# Patient Record
Sex: FEMALE | Race: WHITE | NOT HISPANIC OR LATINO | Employment: OTHER | URBAN - METROPOLITAN AREA
[De-identification: names, ages, dates, MRNs, and addresses within clinical notes are randomized per-mention and may not be internally consistent; named-entity substitution may affect disease eponyms.]

---

## 2021-05-04 ENCOUNTER — APPOINTMENT (OUTPATIENT)
Dept: ULTRASOUND IMAGING | Facility: CLINIC | Age: 61
End: 2021-05-04
Attending: EMERGENCY MEDICINE
Payer: COMMERCIAL

## 2021-05-04 ENCOUNTER — HOSPITAL ENCOUNTER (EMERGENCY)
Facility: CLINIC | Age: 61
Discharge: HOME | End: 2021-05-04
Attending: EMERGENCY MEDICINE | Admitting: EMERGENCY MEDICINE
Payer: COMMERCIAL

## 2021-05-04 VITALS
HEART RATE: 78 BPM | OXYGEN SATURATION: 100 % | WEIGHT: 125 LBS | HEIGHT: 66 IN | BODY MASS INDEX: 20.09 KG/M2 | TEMPERATURE: 98.2 F | RESPIRATION RATE: 16 BRPM | SYSTOLIC BLOOD PRESSURE: 118 MMHG | DIASTOLIC BLOOD PRESSURE: 75 MMHG

## 2021-05-04 DIAGNOSIS — M76.62 ACHILLES TENDINITIS OF LEFT LOWER EXTREMITY: ICD-10-CM

## 2021-05-04 PROCEDURE — 93971 EXTREMITY STUDY: CPT | Mod: LT

## 2021-05-04 PROCEDURE — 99284 EMERGENCY DEPT VISIT MOD MDM: CPT | Mod: 25

## 2021-05-04 ASSESSMENT — ENCOUNTER SYMPTOMS
FEVER: 0
MYALGIAS: 1
VOMITING: 0
ABDOMINAL PAIN: 0

## 2021-05-04 ASSESSMENT — MIFFLIN-ST. JEOR: SCORE: 1138.75

## 2021-05-04 NOTE — ED PROVIDER NOTES
"  History   Chief Complaint:  Leg Pain       HPI   Amy Alex is a 61 year old female who presents with leg pain. The patient complains of left leg pain beginning yesterday while at rest. Pain is worse on her posterior ankle. She describes it as a \"jacobo horse that will not go away\". Reports that the pain has progressively worsened since onset and woke her from sleep at 0500 today. 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 She had a performance on Thursday (04/29) without any trauma, falls, or popping noises. Denies any chance in workout, new medications, or history of DVT. No vomiting, fever, abdominal pain, or chest pain.     Review of Systems   Constitutional: Negative for fever.   Cardiovascular: Negative for chest pain.   Gastrointestinal: Negative for abdominal pain and vomiting.   Musculoskeletal: Positive for myalgias.   All other systems reviewed and are negative.        Allergies:  No known drug allergies     Medications:  The patient is not currently taking any prescribed medications.     Past Medical History:    Scoliosis   Hypercholesterolemia   Postmonopausal    Atrophic vaginitis   Raynaud's disease     Family History:    Brother: PVD  Mother:  Hyperlipidemia     Social History:  Presents to emergency department alone   Works from home     Physical Exam     Patient Vitals for the past 24 hrs:   BP Temp Temp src Pulse Resp SpO2 Height Weight   05/04/21 1111 118/75 98.2  F (36.8  C) Temporal 78 16 100 % 1.676 m (5' 6\") 56.7 kg (125 lb)       Physical Exam  Vitals: reviewed by me  General: Pt seen on [facility], cooperative, and alert to conversation  Eyes: Tracking well, clear conjunctiva BL  ENT: MMM, midline trachea.   Lungs: No tachypnea, no accessory muscle use. No respiratory distress.   CV: Rate as above  Abd: Soft, non tender, no guarding, no rebound. Non distended  MSK: no joint effusion.  No evidence of trauma  Plantarflexion 5 out of " 5 strength.  Able to stand on tiptoe, has some pain however.  Negative Homans' sign.  Does have significant tenderness and some warmth and slight erythema over the proximal aspect of the Achilles tendon.  No other signs of infection seen, no other tenderness found in other areas of her leg.  Skin: No rash  Neuro: Clear speech and no facial droop.  Psych: Not RIS, no e/o AH/VH        Emergency Department Course   Imaging:  US Lower Extremity Venous Duplex Left  IMPRESSION: No evidence of deep venous thrombosis.  As read by Radiology.     Emergency Department Course:    Reviewed:  I reviewed nursing notes, vitals, past medical history and care everywhere    Assessments:  1227 I obtained history and examined the patient as noted above.   1342 I rechecked the patient and explained findings.     Disposition:  The patient was discharged to home.       Impression & Plan     Medical Decision Making:  Amy Alex is a very pleasant 61 year old female who presents to the emergency department with left lower extremity pain specifically around her achilles tendon. 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 Out of an abundance of caution, I did an ultrasound as well, which shows no signs of a DVT. She is able to dorsiflex and plantar flex very well and her planter flexion has 5/5 strength so I do not think that she has torn her achilles tendon. The redness and swelling in this area would lead me to believe that she would benefit from antiinflammatories, and I discussed this with her. She knows to follow with orthopedics within the next week if she is not dramatically improved. Patient fells comfortable with this plan, no evidence of an infection, no evidence of a joint effusion. Will plan for a discharge as above.       Diagnosis:    ICD-10-CM    1. Achilles tendinitis of left lower extremity  M76.62        Scribe Disclosure:  Sadaf MIRANDA  Polina, am serving as a scribe at 12:24 PM on 5/4/2021 to document services personally performed by Stephon Castelan MD based on my observations and the provider's statements to me.           Stephon Castelan MD  05/04/21 [id number]

## 2021-05-04 NOTE — DISCHARGE INSTRUCTIONS
As we discussed, the most likely cause of your pain at this point seems to be Achilles tendinitis.  This may be because of your recent recital and the amount of dancing you been doing.  Your ultrasound is normal with no evidence of a blood clot, or an infection.  I do think tendinitis is most likely cause, please take 600 mg of Motrin, 3 times a day as we discussed for the next 5 days.  Do follow with your orthopedist as above unless the pain is completely gone, but try and see them within the next week.  Try and avoid any activities that make your pain worse, as this may lead to worsening inflammation and possibly a tendon rupture as we discussed.  Come back to the ER with any other concerns.

## 2021-05-04 NOTE — ED TRIAGE NOTES
Patient with pain left lower leg that started yesterday. Patient states today it has  gotten worse.

## 2022-09-02 ENCOUNTER — OFFICE VISIT (OUTPATIENT)
Dept: URGENT CARE | Facility: URGENT CARE | Age: 62
End: 2022-09-02
Payer: COMMERCIAL

## 2022-09-02 VITALS
TEMPERATURE: 98.2 F | DIASTOLIC BLOOD PRESSURE: 80 MMHG | OXYGEN SATURATION: 100 % | RESPIRATION RATE: 16 BRPM | SYSTOLIC BLOOD PRESSURE: 120 MMHG | HEART RATE: 69 BPM

## 2022-09-02 DIAGNOSIS — Z20.822 EXPOSURE TO 2019 NOVEL CORONAVIRUS: ICD-10-CM

## 2022-09-02 DIAGNOSIS — R07.0 THROAT PAIN: Primary | ICD-10-CM

## 2022-09-02 LAB
DEPRECATED S PYO AG THROAT QL EIA: NEGATIVE
GROUP A STREP BY PCR: NOT DETECTED

## 2022-09-02 PROCEDURE — 87651 STREP A DNA AMP PROBE: CPT | Performed by: PHYSICIAN ASSISTANT

## 2022-09-02 PROCEDURE — U0005 INFEC AGEN DETEC AMPLI PROBE: HCPCS | Performed by: PHYSICIAN ASSISTANT

## 2022-09-02 PROCEDURE — 99203 OFFICE O/P NEW LOW 30 MIN: CPT | Mod: CS | Performed by: PHYSICIAN ASSISTANT

## 2022-09-02 PROCEDURE — U0003 INFECTIOUS AGENT DETECTION BY NUCLEIC ACID (DNA OR RNA); SEVERE ACUTE RESPIRATORY SYNDROME CORONAVIRUS 2 (SARS-COV-2) (CORONAVIRUS DISEASE [COVID-19]), AMPLIFIED PROBE TECHNIQUE, MAKING USE OF HIGH THROUGHPUT TECHNOLOGIES AS DESCRIBED BY CMS-2020-01-R: HCPCS | Performed by: PHYSICIAN ASSISTANT

## 2022-09-02 RX ORDER — CYCLOBENZAPRINE HCL 5 MG
5 TABLET ORAL DAILY
COMMUNITY
Start: 2022-08-22

## 2022-09-02 RX ORDER — METHYLPREDNISOLONE 4 MG
TABLET, DOSE PACK ORAL
COMMUNITY
Start: 2022-08-22

## 2022-09-02 RX ORDER — BENZOCAINE/MENTHOL 6 MG-10 MG
LOZENGE MUCOUS MEMBRANE
COMMUNITY
Start: 2022-08-22

## 2022-09-02 NOTE — PROGRESS NOTES
Assessment & Plan     Throat pain    You or your child have a sore throat (pharyngitis). This infection is caused by a virus. It can cause throat pain that is worse when swallowing, aching all over, headache, and fever. The infection may be spread by coughing, kissing, or touching others after touching your mouth or nose. Antibiotic medicines don't work against viruses. They are not used for treating this illness.     covid pending  Strep neg, culture pending    OTC motrin  Magic mouthwash for throat pain  - Streptococcus A Rapid Screen w/Reflex to PCR  - Symptomatic; Yes; 8/31/2022 COVID-19 Virus (Coronavirus) by PCR Nose  - Group A Streptococcus PCR Throat Swab  - magic mouthwash (ENTER INGREDIENTS IN COMMENTS) suspension; Swish and spit 5-10 mLs in mouth every 6 hours as needed 30 ml of Benadryl (12.5 mg/5 ml), 60 ml Maalox, 30 ml Viscous Lidocaine    Exposure to 2019 novel coronavirus    Check my chart for results     At today's visit with Amy Alex , we discussed results, diagnosis, medications and formulated a plan.  We also discussed red flags for immediate return to clinic/ER, as well as indications for follow up with PCP if not improved in 3 days. Patient understood and agreed to plan. Amy Alex was discharged with stable vitals and has no further questions.       No follow-ups on file.    Gomez Smith, [facility], PA-C  [facility]    Owen Reyes is a 62 year old, presenting for the following health issues:  Pharyngitis (Sore throat X 2 days.  has covid )      HPI   Review of Systems   Constitutional, HEENT, cardiovascular, pulmonary, gi and gu systems are negative, except as otherwise noted.      Objective    /80   Pulse 69   Temp 98.2  F (36.8  C) (Oral)   Resp 16   SpO2 100%   There is no height or weight on file to calculate BMI.  Physical Exam   GENERAL: healthy, alert and no distress  EYES: Eyes grossly normal to inspection, PERRL and  conjunctivae and sclerae normal  HENT: ear canals and TM's normal, nose and mouth without ulcers or lesions  NECK: no adenopathy, no asymmetry, masses, or scars and thyroid normal to palpation  RESP: lungs clear to auscultation - no rales, rhonchi or wheezes  CV: regular rate and rhythm, normal S1 S2, no S3 or S4, no murmur, click or rub, no peripheral edema and peripheral pulses strong  MS: no gross musculoskeletal defects noted, no edema  SKIN: no suspicious lesions or rashes  NEURO: Normal strength and tone, mentation intact and speech normal  PSYCH: mentation appears normal, affect normal/bright        Results for orders placed or performed in visit on 09/02/22   Streptococcus A Rapid Screen w/Reflex to PCR     Status: Normal    Specimen: Throat; Swab   Result Value Ref Range    Group A Strep antigen Negative Negative               @CAL@  @[facility]@

## 2022-09-03 LAB — SARS-COV-2 RNA RESP QL NAA+PROBE: NEGATIVE

## 2022-12-26 ENCOUNTER — HEALTH MAINTENANCE LETTER (OUTPATIENT)
Age: 62
End: 2022-12-26

## 2023-06-02 ENCOUNTER — HEALTH MAINTENANCE LETTER (OUTPATIENT)
Age: 63
End: 2023-06-02

## 2023-09-13 ENCOUNTER — ANCILLARY ORDERS (OUTPATIENT)
Dept: CT IMAGING | Facility: CLINIC | Age: 63
End: 2023-09-13

## 2023-09-13 DIAGNOSIS — E74.10 FRUCTOSE INTOLERANCE: ICD-10-CM

## 2023-09-13 DIAGNOSIS — K52.832 LYMPHOCYTIC COLITIS: Primary | ICD-10-CM

## 2023-09-13 DIAGNOSIS — R63.4 WEIGHT LOSS: ICD-10-CM

## 2023-09-27 ENCOUNTER — ANCILLARY PROCEDURE (OUTPATIENT)
Dept: CT IMAGING | Facility: CLINIC | Age: 63
End: 2023-09-27
Attending: HOSPITALIST
Payer: COMMERCIAL

## 2023-09-27 DIAGNOSIS — R63.4 WEIGHT LOSS: ICD-10-CM

## 2023-09-27 DIAGNOSIS — E74.10 FRUCTOSE INTOLERANCE: ICD-10-CM

## 2023-09-27 DIAGNOSIS — K52.832 LYMPHOCYTIC COLITIS: ICD-10-CM

## 2023-09-27 PROCEDURE — 74177 CT ABD & PELVIS W/CONTRAST: CPT

## 2023-09-27 PROCEDURE — 250N000011 HC RX IP 250 OP 636: Performed by: HOSPITALIST

## 2023-09-27 PROCEDURE — 250N000009 HC RX 250: Performed by: HOSPITALIST

## 2023-09-27 RX ORDER — IOPAMIDOL 755 MG/ML
75 INJECTION, SOLUTION INTRAVASCULAR ONCE
Status: COMPLETED | OUTPATIENT
Start: 2023-09-27 | End: 2023-09-27

## 2023-09-27 RX ADMIN — SODIUM CHLORIDE 40 ML: 9 INJECTION, SOLUTION INTRAVENOUS at 09:59

## 2023-09-27 RX ADMIN — IOPAMIDOL 75 ML: 755 INJECTION, SOLUTION INTRAVENOUS at 09:59

## 2024-06-23 ENCOUNTER — HEALTH MAINTENANCE LETTER (OUTPATIENT)
Age: 64
End: 2024-06-23

## 2024-08-18 ENCOUNTER — HOSPITAL ENCOUNTER (EMERGENCY)
Facility: CLINIC | Age: 64
Discharge: HOME | End: 2024-08-18
Attending: EMERGENCY MEDICINE | Admitting: EMERGENCY MEDICINE
Payer: COMMERCIAL

## 2024-08-18 ENCOUNTER — APPOINTMENT (OUTPATIENT)
Dept: GENERAL RADIOLOGY | Facility: CLINIC | Age: 64
End: 2024-08-18
Attending: EMERGENCY MEDICINE
Payer: COMMERCIAL

## 2024-08-18 VITALS
RESPIRATION RATE: 19 BRPM | HEART RATE: 63 BPM | OXYGEN SATURATION: 95 % | BODY MASS INDEX: 19.29 KG/M2 | WEIGHT: 120 LBS | DIASTOLIC BLOOD PRESSURE: 82 MMHG | SYSTOLIC BLOOD PRESSURE: 128 MMHG | TEMPERATURE: 97.8 F | HEIGHT: 66 IN

## 2024-08-18 DIAGNOSIS — R07.9 CHEST PAIN, UNSPECIFIED TYPE: ICD-10-CM

## 2024-08-18 LAB
ALBUMIN SERPL BCG-MCNC: 4.3 G/DL (ref 3.5–5.2)
ALP SERPL-CCNC: 83 U/L (ref 40–150)
ALT SERPL W P-5'-P-CCNC: 27 U/L (ref 0–50)
ANION GAP SERPL CALCULATED.3IONS-SCNC: 13 MMOL/L (ref 7–15)
AST SERPL W P-5'-P-CCNC: 31 U/L (ref 0–45)
ATRIAL RATE - MUSE: 60 BPM
BASOPHILS # BLD AUTO: 0 10E3/UL (ref 0–0.2)
BASOPHILS NFR BLD AUTO: 0 %
BILIRUB SERPL-MCNC: 0.4 MG/DL
BUN SERPL-MCNC: 11 MG/DL (ref 8–23)
CALCIUM SERPL-MCNC: 9.2 MG/DL (ref 8.8–10.4)
CHLORIDE SERPL-SCNC: 96 MMOL/L (ref 98–107)
CREAT SERPL-MCNC: 0.94 MG/DL (ref 0.51–0.95)
DIASTOLIC BLOOD PRESSURE - MUSE: NORMAL MMHG
EGFRCR SERPLBLD CKD-EPI 2021: 67 ML/MIN/1.73M2
EOSINOPHIL # BLD AUTO: 0 10E3/UL (ref 0–0.7)
EOSINOPHIL NFR BLD AUTO: 1 %
ERYTHROCYTE [DISTWIDTH] IN BLOOD BY AUTOMATED COUNT: 12.2 % (ref 10–15)
GLUCOSE SERPL-MCNC: 120 MG/DL (ref 70–99)
HCO3 SERPL-SCNC: 25 MMOL/L (ref 22–29)
HCT VFR BLD AUTO: 42.8 % (ref 35–47)
HGB BLD-MCNC: 14.5 G/DL (ref 11.7–15.7)
IMM GRANULOCYTES # BLD: 0 10E3/UL
IMM GRANULOCYTES NFR BLD: 0 %
INTERPRETATION ECG - MUSE: NORMAL
LYMPHOCYTES # BLD AUTO: 1.2 10E3/UL (ref 0.8–5.3)
LYMPHOCYTES NFR BLD AUTO: 42 %
MAGNESIUM SERPL-MCNC: 1.8 MG/DL (ref 1.7–2.3)
MCH RBC QN AUTO: 30.9 PG (ref 26.5–33)
MCHC RBC AUTO-ENTMCNC: 33.9 G/DL (ref 31.5–36.5)
MCV RBC AUTO: 91 FL (ref 78–100)
MONOCYTES # BLD AUTO: 0.5 10E3/UL (ref 0–1.3)
MONOCYTES NFR BLD AUTO: 18 %
NEUTROPHILS # BLD AUTO: 1.1 10E3/UL (ref 1.6–8.3)
NEUTROPHILS NFR BLD AUTO: 38 %
NRBC # BLD AUTO: 0 10E3/UL
NRBC BLD AUTO-RTO: 0 /100
P AXIS - MUSE: 56 DEGREES
PLAT MORPH BLD: ABNORMAL
PLATELET # BLD AUTO: 203 10E3/UL (ref 150–450)
POTASSIUM SERPL-SCNC: 3.7 MMOL/L (ref 3.4–5.3)
PR INTERVAL - MUSE: 152 MS
PROT SERPL-MCNC: 7.4 G/DL (ref 6.4–8.3)
QRS DURATION - MUSE: 76 MS
QT - MUSE: 412 MS
QTC - MUSE: 412 MS
R AXIS - MUSE: 66 DEGREES
RBC # BLD AUTO: 4.7 10E6/UL (ref 3.8–5.2)
RBC MORPH BLD: ABNORMAL
SODIUM SERPL-SCNC: 134 MMOL/L (ref 135–145)
SYSTOLIC BLOOD PRESSURE - MUSE: NORMAL MMHG
T AXIS - MUSE: 76 DEGREES
TROPONIN T SERPL HS-MCNC: 8 NG/L
TROPONIN T SERPL HS-MCNC: 8 NG/L
VARIANT LYMPHS BLD QL SMEAR: PRESENT
VENTRICULAR RATE- MUSE: 60 BPM
WBC # BLD AUTO: 2.9 10E3/UL (ref 4–11)

## 2024-08-18 PROCEDURE — 84484 ASSAY OF TROPONIN QUANT: CPT | Performed by: EMERGENCY MEDICINE

## 2024-08-18 PROCEDURE — 83735 ASSAY OF MAGNESIUM: CPT | Performed by: EMERGENCY MEDICINE

## 2024-08-18 PROCEDURE — 82565 ASSAY OF CREATININE: CPT | Performed by: EMERGENCY MEDICINE

## 2024-08-18 PROCEDURE — 71046 X-RAY EXAM CHEST 2 VIEWS: CPT

## 2024-08-18 PROCEDURE — 99285 EMERGENCY DEPT VISIT HI MDM: CPT | Mod: 25

## 2024-08-18 PROCEDURE — 93005 ELECTROCARDIOGRAM TRACING: CPT

## 2024-08-18 PROCEDURE — 36415 COLL VENOUS BLD VENIPUNCTURE: CPT | Performed by: EMERGENCY MEDICINE

## 2024-08-18 PROCEDURE — 85004 AUTOMATED DIFF WBC COUNT: CPT | Performed by: EMERGENCY MEDICINE

## 2024-08-18 ASSESSMENT — ACTIVITIES OF DAILY LIVING (ADL)
ADLS_ACUITY_SCORE: 35

## 2024-08-18 NOTE — ED NOTES
Bed: ED12  Expected date:   Expected time:   Means of arrival:   Comments:  524 64F chest pain - GREEN

## 2024-08-18 NOTE — ED TRIAGE NOTES
PT tested + for Covid on Thursday and started Paxlovid  on Friday.  Saw MD via virtual visit Thurs am.  Woke up with sharp CP this morning.  Pt denies on any meds.  Pt developed sore throat on Tuesday which continued into Wed and covid test was sl positive.  Rechecked covid testt Thursday and was +, had a virtual visit with MD.  Temp 101 and H/A, started Paxlovid.  H/A Saturday.  Today woke up with [facility] at approx 0445.  VSS per EMS.  EMS noted peaked T waves.  Of note pt mother was in ED room 12 (where pt is) and  a few days later at [facility].  Offered pt a different room but ok to stay in room 12       Triage Assessment (Adult)       Row Name 24 0620          Triage Assessment    Airway WDL WDL        Respiratory WDL    Respiratory WDL WDL        Skin Circulation/Temperature WDL    Skin Circulation/Temperature WDL WDL        Cardiac WDL    Cardiac WDL WDL        Peripheral/Neurovascular WDL    Peripheral Neurovascular WDL WDL        Cognitive/Neuro/Behavioral WDL    Cognitive/Neuro/Behavioral WDL WDL

## 2024-08-18 NOTE — ED PROVIDER NOTES
"  Emergency Department Note      History of Present Illness     Chief Complaint   Chest Pain and Covid Concern (PT tested + for Covid on Thursday and started Paxlovid  on Friday.  Saw MD via virtual visit Thurs am.  Woke up with sharp CP this morning)    FABRICIO Alex is a 64 year old female with a history of hyperlipidemia and lymphocytic colitis who presents to the ED via EMS for evaluation of chest pain. The patient reports that she developed a sore throat and body aches on Tuesday. On Wednesday, she was having some post nasal drip and cough.  COVID test was positive on Thursday.  She also developed a headache on Thursday. She saw a PCP via telephone visit on Thursday morning at 10 am, where she was prescribe Paxlovid and Tessalon Perles. She notes that her temperature has been up to 101. This morning the patient woke up at approximately 0445 with sharp chest pain, which lasted for 30 minutes. She has never had chest pain like this before. She is not currently having chest pain. The patient denies having leg swelling.      Independent Historian   None    Review of External Notes   Reviewed patient's telemedicine visit from 8/15/2024, patient was prescribed Tessalon Perles and Paxlovid for her COVID.    Past Medical History     Medical History and Problem List   Scoliosis   Hyperlipidemia   Postmenopausal    Atrophic vaginitis   Raynaud's disease   Lymphocytic colitis   Impingement syndrome of right shoulder     Medications   Benzonatate  Nirmatrelvir-ritonavir   Cetirizine  Fluticasone    Physical Exam     Patient Vitals for the past 24 hrs:   BP Temp Temp src Pulse Resp SpO2 Height Weight   08/18/24 0908 -- -- -- 63 19 95 % -- --   08/18/24 0847 -- -- -- -- -- -- 1.676 m (5' 6\") 54.4 kg (120 lb)   08/18/24 0845 -- -- -- 57 14 94 % -- --   08/18/24 0708 -- -- -- 64 16 96 % -- --   08/18/24 0700 -- -- -- 60 10 96 % -- --   08/18/24 0658 128/82 -- -- 65 18 96 % -- --   08/18/24 0648 136/85 -- -- 60 10 96 % " -- --   08/18/24 0600 (!) 143/97 97.8  F (36.6  C) Temporal -- 16 -- -- --     Physical Exam  General: Well-nourished, resting comfortably when I enter the room  Eyes: Pupils equal, conjunctivae pink no scleral icterus or conjunctival injection  ENT:  Moist mucus membranes  Respiratory:  Lungs clear to auscultation bilaterally, no crackles/rubs/wheezes.  Good air movement  CV: Normal rate and rhythm, no murmurs  GI:  Abdomen soft and non-distended.  No tenderness, guarding or rebound  Skin: Warm, dry.  No rashes or petechiae  Musculoskeletal: No peripheral edema or calf tenderness  Neuro: Alert and oriented to person/place/time  Psychiatric: Normal affect    Diagnostics     Lab Results   Labs Ordered and Resulted from Time of ED Arrival to Time of ED Departure   COMPREHENSIVE METABOLIC PANEL - Abnormal       Result Value    Sodium 134 (*)     Potassium 3.7      Carbon Dioxide (CO2) 25      Anion Gap 13      Urea Nitrogen 11.0      Creatinine 0.94      GFR Estimate 67      Calcium 9.2      Chloride 96 (*)     Glucose 120 (*)     Alkaline Phosphatase 83      AST 31      ALT 27      Protein Total 7.4      Albumin 4.3      Bilirubin Total 0.4     CBC WITH PLATELETS AND DIFFERENTIAL - Abnormal    WBC Count 2.9 (*)     RBC Count 4.70      Hemoglobin 14.5      Hematocrit 42.8      MCV 91      MCH 30.9      MCHC 33.9      RDW 12.2      Platelet Count 203      % Neutrophils 38      % Lymphocytes 42      % Monocytes 18      % Eosinophils 1      % Basophils 0      % Immature Granulocytes 0      NRBCs per 100 WBC 0      Absolute Neutrophils 1.1 (*)     Absolute Lymphocytes 1.2      Absolute Monocytes 0.5      Absolute Eosinophils 0.0      Absolute Basophils 0.0      Absolute Immature Granulocytes 0.0      Absolute NRBCs 0.0     RBC AND PLATELET MORPHOLOGY - Abnormal    RBC Morphology Confirmed RBC Indices      Platelet Assessment        Value: Automated Count Confirmed. Platelet morphology is normal.    Reactive Lymphocytes  Present (*)    TROPONIN T, HIGH SENSITIVITY - Normal    Troponin T, High Sensitivity 8     MAGNESIUM - Normal    Magnesium 1.8     TROPONIN T, HIGH SENSITIVITY - Normal    Troponin T, High Sensitivity 8       Imaging   Chest XR,  PA & LAT   Final Result   IMPRESSION: No pleural fluid or pneumothorax. No airspace disease or edema. Normal size of the heart.        EKG   ECG taken at 0636, ECG read at 0643  Normal sinus rhythm  Normal ECG   No significant change as compared to prior, dated 1/12/2015.  Rate 60 bpm. AK interval 152 ms. QRS duration 76 ms. QT/QTc 412/412 ms. P-R-T axes 56 66 76.    Independent Interpretation   Chest x-ray does not show any sign of consolidation    ED Course      Discussion of Management   None    ED Course   ED Course as of 08/18/24 0954   Sun Aug 18, 2024   0623 I obtained history and examined the patient as noted above.    0926 I rechecked the patient and explained findings.      Additional Documentation  None    Medical Decision Making / Diagnosis     JENI Alex is a 64 year old female with a history of hyperlipidemia and lymphocytic colitis presents to the emergency department with a complaint of chest pain.  Patient started to have chest pain 30 minutes prior to arrival to the emergency department, this has now resolved.  She reports that it was a sharp pain in the center of her chest.  Did not radiate.  On exam patient is not in any acute distress, lung sounds are clear.  No tenderness to palpation of her chest.  No swelling of her lower extremities.  Vital signs are within acceptable limits, the patient is not hypoxic, or tachycardic.  Troponins remained stable at 8.  EKG does not show any signs of ischemia.  I have low suspicion for PE as the patient is not tachycardic, short of breath, and her chest pain has resolved.  Chest x-ray does not show any signs of pneumonia.  No USHA.  Electrolytes are all within acceptable limits.  On reevaluation, the patient remains  asymptomatic.  I will have her follow-up with primary care.  She can continue her Paxlovid.  Otherwise just supportive care for her COVID.  Patient is discharged home.    Disposition   The patient was discharged.     Diagnosis     ICD-10-CM    1. Chest pain, unspecified type  R07.9         Discharge Medications   New Prescriptions    No medications on file     Scribe Disclosure:  I, Sandoval Wesley, am serving as a scribe at 6:50 AM on 8/18/2024 to document services personally performed by Tayler Ronquillo MD, based on my observations and the provider's statements to me.        Tayler Ronquillo MD  08/18/24 1185

## 2025-07-12 ENCOUNTER — HEALTH MAINTENANCE LETTER (OUTPATIENT)
Age: 65
End: 2025-07-12